# Patient Record
Sex: FEMALE | Race: AMERICAN INDIAN OR ALASKA NATIVE | NOT HISPANIC OR LATINO | Employment: OTHER | URBAN - METROPOLITAN AREA
[De-identification: names, ages, dates, MRNs, and addresses within clinical notes are randomized per-mention and may not be internally consistent; named-entity substitution may affect disease eponyms.]

---

## 2017-01-25 PROBLEM — H25.812 COMBINED FORMS OF AGE-RELATED CATARACT, LEFT EYE: Status: ACTIVE | Noted: 2017-01-25

## 2018-01-22 PROBLEM — Z28.21 PNEUMOCOCCAL VACCINE REFUSED: Status: ACTIVE | Noted: 2018-01-22

## 2018-01-22 PROBLEM — R09.89 BRUIT OF RIGHT CAROTID ARTERY: Status: RESOLVED | Noted: 2018-01-22 | Resolved: 2018-01-22

## 2018-01-22 PROBLEM — R09.89 BILATERAL CAROTID BRUITS: Status: ACTIVE | Noted: 2018-01-22

## 2018-01-22 PROBLEM — Z71.85 VACCINE COUNSELING: Status: ACTIVE | Noted: 2018-01-22

## 2018-01-22 PROBLEM — R09.89 BRUIT OF RIGHT CAROTID ARTERY: Status: ACTIVE | Noted: 2018-01-22

## 2018-10-01 PROBLEM — H18.529 ANTERIOR BASEMENT MEMBRANE DYSTROPHY: Status: ACTIVE | Noted: 2018-10-01

## 2018-10-01 PROBLEM — H35.033 HYPERTENSIVE RETINOPATHY OF BOTH EYES, GRADE 2: Status: ACTIVE | Noted: 2018-10-01

## 2018-10-01 PROBLEM — H25.812 COMBINED FORMS OF AGE-RELATED CATARACT, LEFT EYE: Status: RESOLVED | Noted: 2017-01-25 | Resolved: 2018-10-01

## 2020-01-29 PROBLEM — I10 BENIGN ESSENTIAL HTN: Status: ACTIVE | Noted: 2020-01-29

## 2020-04-27 PROBLEM — I10 BENIGN ESSENTIAL HTN: Status: RESOLVED | Noted: 2020-01-29 | Resolved: 2020-04-27

## 2020-04-27 PROBLEM — R06.09 DYSPNEA ON EXERTION: Status: ACTIVE | Noted: 2020-04-27

## 2020-04-27 PROBLEM — I10 ESSENTIAL HYPERTENSION: Status: ACTIVE | Noted: 2020-04-27

## 2020-04-27 PROBLEM — I77.1 STENOSIS OF LEFT SUBCLAVIAN ARTERY: Status: ACTIVE | Noted: 2020-04-27

## 2020-04-27 PROBLEM — J98.4 PULMONARY INSUFFICIENCY: Status: ACTIVE | Noted: 2020-04-27

## 2020-04-27 PROBLEM — I51.7 LEFT VENTRICULAR HYPERTROPHY: Status: ACTIVE | Noted: 2020-04-27

## 2020-07-09 PROBLEM — Z01.89 ENCOUNTER FOR IMAGING STUDY TO CONFIRM NASOGASTRIC (NG) TUBE PLACEMENT: Status: ACTIVE | Noted: 2020-07-09

## 2020-07-09 PROBLEM — K56.609 SMALL BOWEL OBSTRUCTION: Status: ACTIVE | Noted: 2020-07-09

## 2020-07-13 ENCOUNTER — PATIENT OUTREACH (OUTPATIENT)
Dept: ADMINISTRATIVE | Facility: CLINIC | Age: 70
End: 2020-07-13

## 2020-07-13 NOTE — TELEPHONE ENCOUNTER
C3 nurse attempted to contact patient. No answer. The following message was left for the patient to return the call:  Dayan - relative given message with OOC# and C3 nurse name.    The patient does not have a scheduled [facility] appointment within 7-14 days post hospital discharge date 7/10/2020. Patient PCP not within [facility].

## 2020-07-14 NOTE — PATIENT INSTRUCTIONS
Small Bowel Obstruction  A small bowel obstruction occurs when there is partial or total blockage in the small intestine (bowel). As a result, waste cannot move through the bowel properly and out of the body. Treatment is needed right away to remove the blockage. This can relieve painful symptoms. It can also prevent serious complications such as tissue death or rupture of the small bowel. Without treatment, a small bowel obstruction can be fatal.  Causes of Small Bowel Obstruction  A small bowel obstruction can be caused by:  Scar tissue (adhesions). These may form after surgery or an infection.  Hernia (weakness or tear in the wall of the abdomen). This can cause a portion of the small bowel to push out and appear as a visible bulge under the skin.  Certain medical conditions. These include intussusception (occurs when a portion of the bowel slides inside another portion) and Crohns disease (illness that causes inflammation and sores to form in the digestive tract).  Abnormal tissue growths (tumors). These can form on the inside or outside of the small bowel, and are usually due to cancer.  Symptoms of Small Bowel Obstruction  Common symptoms include:  Abdominal cramping and pain  Abdominal swelling and bloating  Nausea and vomiting  Inability to pass gas  Inability to pass stool (constipation)  Diarrhea  Diagnosing Small Bowel Obstruction  Your doctor will ask about your symptoms and health history. Youll also have a physical exam. In addition, tests may be done to confirm the problem. These can include:  Imaging tests. These provide pictures of the small bowel. Common tests include x-rays and a computed tomography (CT) scan.  Blood tests. These check for infection and other problems such as dehydration.  Upper gastrointestinal (GI) series with a small bowel follow-through. This test is done to take x-rays of the upper digestive tract from the mouth through the small bowel. A contrast fluid is used. The  contrast fluid coats the inside of the upper digestive tract so it will show up clearly on x-rays.  Treating Small Bowel Obstruction  Treatment takes place in a hospital. As part of your care, the following may be done:  No food or drink is given by mouth. This allows your bowels to rest.  An intravenous (IV) line is placed in a vein in your arm or hand. The IV line is used to give fluids. It may also be used to give medications. These may be needed to relieve pain, nausea, and other symptoms. They may also be needed to treat or prevent infections.  A soft, thin, flexible tube (nasogastric tube) is inserted through your nose and into your stomach. The tube is used to remove extra gas and fluid in your stomach and bowels. This helps to relieve symptoms such as pain and swelling.  In severe cases, such as when the small bowel is almost or totally blocked, surgery is done. During surgery, the blockage is removed. Portions of the bowel may also be removed if there is tissue death. Other repair may be done as well, depending on the cause of the blockage. Your doctor will give you more information about surgery, if needed.  Youll be observed in the hospital until your symptoms improve. Your doctor will tell you when you can return home.  Long-term Concerns   After treatment, many people recover with no lasting effects. If a long portion of the bowel is removed, there is a greater chance for lifelong digestive problems such as irregular bowel movements. Work with your doctor to learn the best ways to manage any symptoms you may have and to protect your health.    When to Call the Doctor  Call your doctor right away if you have any of the following:  Abdominal swelling or cramping that wont go away  Inability to pass stool or gas  Nausea or vomiting (especially if the vomit looks or smells like stool)   © 3708-5443 [employer], [address] [place] [address], [place] [address]. All rights reserved. This information is not  intended as a substitute for professional medical care. Always follow your healthcare professional's instructions.

## 2020-09-03 PROBLEM — Z87.19 HISTORY OF SMALL BOWEL OBSTRUCTION: Status: ACTIVE | Noted: 2020-09-03

## 2020-09-16 ENCOUNTER — NURSE TRIAGE (OUTPATIENT)
Dept: ADMINISTRATIVE | Facility: CLINIC | Age: 70
End: 2020-09-16

## 2020-09-16 NOTE — TELEPHONE ENCOUNTER
Post procedure follow up call, patient denies onset of any new symptoms.     Additional Information   Negative: Nursing judgment   Negative: Nursing judgment   Negative: Nursing judgment   Negative: Nursing judgment   Negative: Information only question and nurse able to answer    Protocols used: INFORMATION ONLY CALL - NO TRIAGE-A-OH

## 2021-05-06 ENCOUNTER — PATIENT MESSAGE (OUTPATIENT)
Dept: RESEARCH | Facility: HOSPITAL | Age: 71
End: 2021-05-06

## 2021-05-11 PROBLEM — I25.10 CORONARY ARTERY DISEASE INVOLVING NATIVE CORONARY ARTERY OF NATIVE HEART WITHOUT ANGINA PECTORIS: Status: ACTIVE | Noted: 2021-05-11

## 2021-05-11 PROBLEM — Z01.89 ENCOUNTER FOR IMAGING STUDY TO CONFIRM NASOGASTRIC (NG) TUBE PLACEMENT: Status: RESOLVED | Noted: 2020-07-09 | Resolved: 2021-05-11

## 2021-07-01 ENCOUNTER — PATIENT MESSAGE (OUTPATIENT)
Dept: ADMINISTRATIVE | Facility: OTHER | Age: 71
End: 2021-07-01

## 2023-06-14 ENCOUNTER — SPECIALTY PHARMACY (OUTPATIENT)
Dept: PHARMACY | Facility: CLINIC | Age: 73
End: 2023-06-14

## 2023-06-14 NOTE — TELEPHONE ENCOUNTER
Specialty Pharmacy - Initial Clinical Assessment    Specialty Medication Orders Linked to Encounter      Flowsheet Row Most Recent Value   Medication #1 alirocumab (PRALUENT PEN) 150 mg/mL PnIj (Order#[id number], Rx#[id number])          Patient Diagnosis   I25.10 - CAD (coronary artery disease)  Z95.5 - S/P coronary artery stent placement  I25.2 - MI, old  E78.5 - HLD (hyperlipidemia)    Subjective    Bibiana Small is a 73 y.o. female, who is followed by the specialty pharmacy service for management and education.    Recent Encounters       Date Type Provider Description    06/14/2023 Specialty Pharmacy Adrianne Loaiza, Alexa Initial Clinical Assessment    06/14/2023 Specialty Pharmacy Adrianne Loaiza, Alexa Referral Authorization            Current Outpatient Medications   Medication Sig    alirocumab (PRALUENT PEN) 150 mg/mL PnIj Inject 2 mLs (300 mg total) into the skin once a month as instructed.    aspirin 81 MG Chew Take 81 mg by mouth every other day.     atorvastatin (LIPITOR) 80 MG tablet Take 1 tablet (80 mg total) by mouth every evening.    b complex vitamins tablet Take 1 tablet by mouth once daily.    ezetimibe (ZETIA) 10 mg tablet Take 1 tablet (10 mg total) by mouth once daily.    fish oil-omega-3 fatty acids 300-1,000 mg capsule Take 1 g by mouth once daily.    GARLIC ORAL Take 1 tablet by mouth once daily.    lisinopriL (PRINIVIL,ZESTRIL) 20 MG tablet Take 1 tablet (20 mg total) by mouth every evening.    metoprolol tartrate (LOPRESSOR) 25 MG tablet Take 1 tablet (25 mg total) by mouth 2 (two) times daily.   Last reviewed on 6/13/2023  1:28 PM by Rian Green MD    Review of patient's allergies indicates:  No Known AllergiesLast reviewed on  6/13/2023 11:58 AM by Codi Cruz Valence          Assessment Questions - Documented Responses      Flowsheet Row Most Recent Value   Assessment    Goals of Therapy Status Discussed (new start)   Status of the patients ability to self-administer: Is  "Able   All education points have been covered with patient? Yes, supplemental printed education provided   Assesment completed? Yes   Plan Therapy being initiated   Do you need to open a clinical intervention (i-vent)? No   Do you want to schedule first shipment? Yes   Medication #1 Assessment Info    Patient status New medication   Is this medication appropriate for the patient? Yes   Is this medication effective? Not yet started          Refill Questions - Documented Responses      Flowsheet Row Most Recent Value   Refill Screening Questions    When does the patient need to receive the medication? 06/19/23   Refill Delivery Questions    How will the patient receive the medication? MEDRx   When does the patient need to receive the medication? 06/19/23   Shipping Address Home   Address in [facility] confirmed and updated if neccessary? Yes   Expected Copay ($) 0   Is the patient able to afford the medication copay? Yes   Payment Method zero copay   Days supply of Refill 28   Supplies needed? No supplies needed   Refill activity completed? Yes   Refill activity plan Refill scheduled   Shipment/Pickup Date: 06/19/23            Objective    She has a past medical history of Bilateral cataracts, Coronary artery disease, Hyperlipidemia, Hypertension, MI (myocardial infarction) (2002), S/P coronary artery stent placement, and VT (ventricular tachycardia).    Tried/failed medications: statins    BP Readings from Last 4 Encounters:   06/13/23 117/72   05/22/23 126/80   03/14/23 100/70   09/13/22 134/74     Ht Readings from Last 4 Encounters:   05/22/23 5' 3" (1.6 m)   03/14/23 5' 3" (1.6 m)   09/13/22 5' 3" (1.6 m)   03/08/22 5' 3" (1.6 m)     Wt Readings from Last 4 Encounters:   06/13/23 57.1 kg (125 lb 12.4 oz)   05/22/23 56.7 kg (125 lb)   03/14/23 55.3 kg (122 lb)   09/13/22 54 kg (119 lb)       The goals of prescribed drug therapy management include:  Supporting patient to meet the prescriber's medical " treatment objectives  Improving or maintaining quality of life  Maintaining optimal therapy adherence  Minimizing and managing side effects      Goals of Therapy Status: Discussed (new start)    Assessment/Plan  Patient plans to start therapy on 06/19/23      Indication, dosage, appropriateness, effectiveness, safety and convenience of her specialty medication(s) were reviewed today.     Patient Education   Patient received education on the following:   Expectations and possible outcomes of therapy  Proper use, timely administration, and missed dose management  Duration of therapy  Side effects, including prevention, minimization, and management  Contraindications and safety precautions  New or changed medications, including prescribe and over the counter medications and supplements  Reviews recommended vaccinations, as appropriate  Storage, safe handling, and disposal    Pt expressed understanding and had no questions at this time.    Tasks added this encounter   No tasks added.   Tasks due within next 3 months   No tasks due.     Adrianne Loaiza, PharmD  [facility]  [address] [place] [id number]  Phone: [phone]  Fax: [phone]

## 2023-07-11 ENCOUNTER — SPECIALTY PHARMACY (OUTPATIENT)
Dept: PHARMACY | Facility: CLINIC | Age: 73
End: 2023-07-11

## 2023-07-11 NOTE — TELEPHONE ENCOUNTER
Pt reports she was injecting 1 pen every 4 weeks instead of 2 pens every 4 weeks. Informed pt she should inject 2 pens every 4 weeks. Pt will inject her remaining pen today and going forward wants to inject her 2 pens on the 1st of every month. Pending call accordingly for next fill.    Pt would also like her lipids monitored after 3 months on Praluent to see how she is responding. I agree per AHA guideline recommendations. Staff message sent to provider for follow up.

## 2023-07-25 NOTE — TELEPHONE ENCOUNTER
Specialty Pharmacy - Refill Coordination    Specialty Medication Orders Linked to Encounter      Flowsheet Row Most Recent Value   Medication #1 alirocumab (PRALUENT PEN) 150 mg/mL PnIj (Order#[id number], Rx#[id number])            Refill Questions - Documented Responses      Flowsheet Row Most Recent Value   Patient Availability and HIPAA Verification    Does patient want to proceed with activity? Yes   HIPAA/medical authority confirmed? Yes   Relationship to patient of person spoken to? Self   Refill Screening Questions    Would patient like to speak to a pharmacist? No   When does the patient need to receive the medication? 08/01/23   Refill Delivery Questions    How will the patient receive the medication? MEDRx   When does the patient need to receive the medication? 08/01/23   Shipping Address Home   Address in [facility] confirmed and updated if neccessary? Yes   Expected Copay ($) 0   Is the patient able to afford the medication copay? Yes   Payment Method zero copay   Days supply of Refill 28   Supplies needed? No supplies needed   Refill activity completed? Yes   Refill activity plan Refill scheduled   Shipment/Pickup Date: 07/27/23            Current Outpatient Medications   Medication Sig    alirocumab (PRALUENT PEN) 150 mg/mL PnIj Inject 2 mLs (300 mg total) into the skin once a month as instructed.    aspirin 81 MG Chew Take 81 mg by mouth every other day.     atorvastatin (LIPITOR) 80 MG tablet Take 1 tablet (80 mg total) by mouth every evening.    b complex vitamins tablet Take 1 tablet by mouth once daily.    ezetimibe (ZETIA) 10 mg tablet Take 1 tablet (10 mg total) by mouth once daily.    fish oil-omega-3 fatty acids 300-1,000 mg capsule Take 1 g by mouth once daily.    GARLIC ORAL Take 1 tablet by mouth once daily.    lisinopriL (PRINIVIL,ZESTRIL) 20 MG tablet Take 1 tablet (20 mg total) by mouth every evening.    metoprolol tartrate (LOPRESSOR) 25 MG tablet Take 1 tablet (25 mg total) by  mouth 2 (two) times daily.   Last reviewed on 6/20/2023 10:21 AM by Sabine Shaw, RT    Review of patient's allergies indicates:  No Known Allergies Last reviewed on  6/20/2023 9:34 AM by Amadeo Blackwell      Tasks added this encounter   No tasks added.   Tasks due within next 3 months   No tasks due.     Adrianne Loaiza, PharmD  [facility]  [address] [facility] [address]  Phone: [phone]  Fax: [phone]

## 2023-08-17 ENCOUNTER — SPECIALTY PHARMACY (OUTPATIENT)
Dept: PHARMACY | Facility: CLINIC | Age: 73
End: 2023-08-17

## 2023-08-17 NOTE — TELEPHONE ENCOUNTER
Outgoing call - Praluent refill   Pt next injection is due on 9/1. Informed pt that refill is too soon and will follow up on 8/22.

## 2023-08-22 NOTE — TELEPHONE ENCOUNTER
Specialty Pharmacy - Refill Coordination    Specialty Medication Orders Linked to Encounter      Flowsheet Row Most Recent Value   Medication #1 alirocumab (PRALUENT PEN) 150 mg/mL PnIj (Order#[id number], Rx#[id number])            Refill Questions - Documented Responses      Flowsheet Row Most Recent Value   Patient Availability and HIPAA Verification    Does patient want to proceed with activity? Yes   HIPAA/medical authority confirmed? Yes   Relationship to patient of person spoken to? Self   Refill Screening Questions    Would patient like to speak to a pharmacist? No   When does the patient need to receive the medication? 08/31/23   Refill Delivery Questions    How will the patient receive the medication? MEDRx   When does the patient need to receive the medication? 08/31/23   Shipping Address Home   Address in [facility] confirmed and updated if neccessary? Yes   Expected Copay ($) 0   Is the patient able to afford the medication copay? Yes   Payment Method zero copay   Days supply of Refill 28   Supplies needed? No supplies needed   Refill activity completed? Yes   Refill activity plan Refill scheduled   Shipment/Pickup Date: 08/29/23            Current Outpatient Medications   Medication Sig    alirocumab (PRALUENT PEN) 150 mg/mL PnIj Inject 2 mLs (300 mg total) into the skin once a month as instructed.    aspirin 81 MG Chew Take 81 mg by mouth every other day.     atorvastatin (LIPITOR) 80 MG tablet Take 1 tablet (80 mg total) by mouth every evening.    b complex vitamins tablet Take 1 tablet by mouth once daily.    ezetimibe (ZETIA) 10 mg tablet Take 1 tablet (10 mg total) by mouth once daily.    fish oil-omega-3 fatty acids 300-1,000 mg capsule Take 1 g by mouth once daily.    GARLIC ORAL Take 1 tablet by mouth once daily.    lisinopriL (PRINIVIL,ZESTRIL) 20 MG tablet Take 1 tablet (20 mg total) by mouth every evening.    metoprolol tartrate (LOPRESSOR) 25 MG tablet Take 1 tablet (25 mg total) by  mouth 2 (two) times daily.   Last reviewed on 6/20/2023 10:21 AM by Sabine Shaw RT    Review of patient's allergies indicates:  No Known Allergies Last reviewed on  6/20/2023 9:34 AM by Amadeo Blackwell      Tasks added this encounter   No tasks added.   Tasks due within next 3 months   8/22/2023 - Refill Coordination Outreach (1 time occurrence)     [facility]  [address] [place] [id number]  Phone: [phone]  Fax: [phone]

## 2023-09-22 ENCOUNTER — SPECIALTY PHARMACY (OUTPATIENT)
Dept: PHARMACY | Facility: CLINIC | Age: 73
End: 2023-09-22

## 2023-09-22 NOTE — TELEPHONE ENCOUNTER
Specialty Pharmacy - Refill Coordination    Specialty Medication Orders Linked to Encounter      Flowsheet Row Most Recent Value   Medication #1 alirocumab (PRALUENT PEN) 150 mg/mL PnIj (Order#[id number], Rx#[id number])            Refill Questions - Documented Responses      Flowsheet Row Most Recent Value   Patient Availability and HIPAA Verification    Does patient want to proceed with activity? Yes   HIPAA/medical authority confirmed? Yes   Relationship to patient of person spoken to? Self   Refill Screening Questions    Would patient like to speak to a pharmacist? No   When does the patient need to receive the medication? 10/01/23   Refill Delivery Questions    How will the patient receive the medication? MEDRx   When does the patient need to receive the medication? 10/01/23   Shipping Address Home   Address in [facility] confirmed and updated if neccessary? Yes   Expected Copay ($) 0   Is the patient able to afford the medication copay? Yes   Payment Method zero copay   Days supply of Refill 28   Supplies needed? No supplies needed   Refill activity completed? Yes   Refill activity plan Refill scheduled   Shipment/Pickup Date: 09/28/23            Current Outpatient Medications   Medication Sig    alirocumab (PRALUENT PEN) 150 mg/mL PnIj Inject 2 mLs (300 mg total) into the skin once a month as instructed.    aspirin 81 MG Chew Take 81 mg by mouth every other day.     atorvastatin (LIPITOR) 80 MG tablet Take 1 tablet (80 mg total) by mouth every evening.    b complex vitamins tablet Take 1 tablet by mouth once daily.    ezetimibe (ZETIA) 10 mg tablet Take 1 tablet (10 mg total) by mouth once daily.    fish oil-omega-3 fatty acids 300-1,000 mg capsule Take 1 g by mouth once daily.    GARLIC ORAL Take 1 tablet by mouth once daily.    lisinopriL (PRINIVIL,ZESTRIL) 20 MG tablet TAKE 1 TABLET(20 MG) BY MOUTH EVERY EVENING    metoprolol tartrate (LOPRESSOR) 25 MG tablet Take 1 tablet (25 mg total) by mouth 2  (two) times daily.   Last reviewed on 9/20/2023 10:41 AM by Gerald Bocanegra MA    Review of patient's allergies indicates:  No Known Allergies Last reviewed on  9/20/2023 10:41 AM by Gerald Bocanegra      Tasks added this encounter   No tasks added.   Tasks due within next 3 months   9/19/2023 - Refill Coordination Outreach (1 time occurrence)     Magdi Bermeo [facility] - [facility]  [address] [place] [id number]  Phone: [phone]  Fax: [phone]

## 2024-06-13 PROBLEM — Z91.89 MULTIPLE RISK FACTORS FOR CORONARY ARTERY DISEASE: Status: ACTIVE | Noted: 2024-06-13

## 2024-06-13 PROBLEM — I37.1 PULMONARY REGURGITATION: Status: ACTIVE | Noted: 2024-06-13

## 2024-06-13 PROBLEM — J98.4 PULMONARY INSUFFICIENCY: Status: RESOLVED | Noted: 2020-04-27 | Resolved: 2024-06-13

## 2024-06-13 PROBLEM — Z98.890 S/P CAROTID ENDARTERECTOMY: Status: ACTIVE | Noted: 2024-06-13

## 2024-11-20 PROBLEM — R07.9 CHEST PAIN: Status: ACTIVE | Noted: 2024-11-20

## 2024-11-21 PROBLEM — R07.9 CHEST PAIN: Status: RESOLVED | Noted: 2024-11-20 | Resolved: 2024-11-21

## 2025-01-02 DIAGNOSIS — N18.31 STAGE 3A CHRONIC KIDNEY DISEASE: ICD-10-CM
